# Patient Record
Sex: MALE | Race: WHITE | Employment: FULL TIME | URBAN - METROPOLITAN AREA
[De-identification: names, ages, dates, MRNs, and addresses within clinical notes are randomized per-mention and may not be internally consistent; named-entity substitution may affect disease eponyms.]

---

## 2017-01-04 ENCOUNTER — PRE VISIT (OUTPATIENT)
Dept: PULMONOLOGY | Facility: CLINIC | Age: 37
End: 2017-01-04

## 2017-01-04 NOTE — TELEPHONE ENCOUNTER
1.  Date/reason for appt: 1/25/17 - allergies   2.  Referring provider: Dr. Perez  3.  Call to patient (Yes / No - short description): no, recs / img in epic  4.  Previous care at:   - [facility] ENT   [facility] (scanned in [facility])    - CT Sinus 11/14/16 is in [facility]

## 2017-01-25 ENCOUNTER — OFFICE VISIT (OUTPATIENT)
Dept: PULMONOLOGY | Facility: CLINIC | Age: 37
End: 2017-01-25
Attending: ALLERGY & IMMUNOLOGY
Payer: COMMERCIAL

## 2017-01-25 VITALS
HEART RATE: 84 BPM | SYSTOLIC BLOOD PRESSURE: 126 MMHG | DIASTOLIC BLOOD PRESSURE: 78 MMHG | WEIGHT: 187 LBS | RESPIRATION RATE: 18 BRPM | OXYGEN SATURATION: 95 % | BODY MASS INDEX: 26.77 KG/M2 | HEIGHT: 70 IN | TEMPERATURE: 97.9 F

## 2017-01-25 DIAGNOSIS — J30.89 ALLERGIC RHINITIS CAUSED BY MOLD: ICD-10-CM

## 2017-01-25 DIAGNOSIS — J30.89 ALLERGIC RHINITIS DUE TO AMERICAN HOUSE DUST MITE: Primary | ICD-10-CM

## 2017-01-25 DIAGNOSIS — J30.1 SEASONAL ALLERGIC RHINITIS DUE TO POLLEN: ICD-10-CM

## 2017-01-25 PROCEDURE — 99212 OFFICE O/P EST SF 10 MIN: CPT | Mod: ZF

## 2017-01-25 PROCEDURE — 95004 PERQ TESTS W/ALRGNC XTRCS: CPT | Performed by: ALLERGY & IMMUNOLOGY

## 2017-01-25 PROCEDURE — 95018 ALL TSTG PERQ&IQ DRUGS/BIOL: CPT | Mod: ZF

## 2017-01-25 ASSESSMENT — ENCOUNTER SYMPTOMS
POLYDIPSIA: 1
SMELL DISTURBANCE: 0
INCREASED ENERGY: 0
TROUBLE SWALLOWING: 0
FATIGUE: 1
WEIGHT LOSS: 0
SORE THROAT: 0
CHILLS: 0
FEVER: 0
HALLUCINATIONS: 0
ALTERED TEMPERATURE REGULATION: 0
HOARSE VOICE: 1
NIGHT SWEATS: 0
NECK MASS: 0
WEIGHT GAIN: 0
SINUS PAIN: 1
SINUS CONGESTION: 1
TASTE DISTURBANCE: 0
POLYPHAGIA: 0
DECREASED APPETITE: 0

## 2017-01-25 ASSESSMENT — PAIN SCALES - GENERAL: PAINLEVEL: NO PAIN (0)

## 2017-01-25 NOTE — PATIENT INSTRUCTIONS
"Qnasl 2 sprays in each nostril daily.  If insurance will not cover can use nasocort over the counter 2 sprays in each nostril twice a day.    Mold Allergy    Remove live indoor houseplants (molds are in the soil).    Keep windows and doors shut and run the air conditioner at all times; this keeps the molds outside.    Keep windows closed while in the car and air conditioner on with it set to  recirculate  mode.    Stay indoors as much as possible when the mold count is high. Check allergy counts by going to our website: www.pollen.com    If you have a basement, use a dehumidifier.    Use bleach if you see evidence of molds in bathrooms or ritesh.   MOLD WHERE MOLDS ARE MOST COMMONLY FOUND   Alternaria Outdoors On decaying plants and live plant material, also in soil   Helminthosporium Outdoors On grain plants and grasses, nabeel counts in rural areas when threshing grain   Hormodendrum/Cladosporium Indoors & Outdoors On leather, rubber, cloth, foods, and wood products; in soil, living and dead plants; also released after a rain   Fusarium Outdoors On garden plants (peas, beans, tomatoes, corn etc.) and stored fruits and vegetables   Aspergillus niger Indoors & Outdoors On damp hay, cloth, leather, paper, spoiled foods, decaying plant and vegetable material, in bathrooms and in refrigerator drip pans   Epicoccum Indoors & Outdoors In soil and on living or dead plants (such as small black dots), and uncooked fruit     *All information has been reviewed, updated and approved by:  Dr. [facility] at [facility] updated: 11/2016         Pollen Control Measures      * Keep windows and doors shut and run air conditioner at all times. This keeps outdoor air outside.    * Keep windows closed while in the car and air conditioner on the \"re-circulate\" mode.    * Wash your hair before going to bed at night to reduce exposure during sleep.    * Keep pets outdoors to prevent the pollen from " being brought in on their hair.    * Avoid hanging clothes and linens outside as pollens may collect on the items.     * Don't mow the lawn if you are allergic to grass or weeds. If you must mow the grass, wear a face mask.       Spring: Tree Pollen    Summer: Grass Pollen and Mold    Fall: Weed Pollen and Mold      *All information has been reviewed, updated and approved by:  Dr. Norm Cristina[facility] [facility] updated: 11/2016  DUST MITE ALLERGY  Dust mites are microscopic bugs that live in dust, feeding on dead skin from our bodies. Dust mites flourish in warm, humid environments and therefore are highest  in number in carpeting, pillows and mattresses.     Tips:    Encase pillows, mattresses, and box springs in zippered allergy proof covers.    Wash all bed linens in at least 1300 F every week.    Remove stuffed animals or freeze them every other week.     Remove upholstered furniture from the bedroom and consider removing the carpet.     Keep ceiling fans off in the bedroom as they can stir up dust mite allergens.    Frequently dust and vacuum the house, especially the bedroom.    Acaracides (chemicals which kill mites) are available for use in the home. These acaracides require repeated applications to remain effective and may irritate asthmatics. It is still unclear how much, if any clinical benefit is gained by the use of acaracides. Therefore these agents are usually not recommended for initial mite control.        *All information has been reviewed, updated and approved by:  Dr. Norm Cristina[facility] [facility] updated: 11/2016

## 2017-01-25 NOTE — NURSING NOTE
Chief Complaint   Patient presents with     Consult     New consult on Carlo and his allergy issues     Godwin Hernandez CMA at 12:44 PM on 1/25/2017

## 2017-01-25 NOTE — PROGRESS NOTES
"Reason for Visit  zA Sykes is a 36 year old male who is referred by [facility] and ENT for facial pressure.    Allergy HPI  Az presents today with a concern of \"something funny with his sinuses\".  It has been going on for a little over a year.  It started rather suddenly.  He feels like there is pressure behind his eyes and his face which he went to ENT and they said everything was normal and said maybe see Neurology but he said that he does not feel it is related to neurological pain.  He says he gets drainage in the front of his nose.  It is yellow.  It occasionally has some blood in it.  He denies any sneezing, itchy eyes or itchy nose.  He has tried Flonase and has occasional benefit, but not great.  He has tried Allegra without any benefit.  In general, breathing out of his lungs is fine.  He is otherwise feeling healthy at this time.      SOCIAL HISTORY:  No pets, no smoke exposure.  He is a  and he is uncertain if he has any exposures.  He normally works out of a [employer] but no current problems or high chemical volatile compounds he is working with at this point.      FAMILY HISTORY:  Nobody has allergies.         The patient was seen and examined by Norm Arias MD   Current Outpatient Prescriptions   Medication     fluticasone (FLONASE) 50 MCG/ACT spray     No current facility-administered medications for this visit.     No Known Allergies  Social History     Social History     Marital Status: Single     Spouse Name: N/A     Number of Children: N/A     Years of Education: N/A     Occupational History     Not on file.     Social History Main Topics     Smoking status: Never Smoker      Smokeless tobacco: Not on file     Alcohol Use: No     Drug Use: No     Sexual Activity: No     Other Topics Concern     Not on file     Social History Narrative     Past Medical History   Diagnosis Date     Sebaceous cyst of left axilla      Past Surgical History   Procedure " "Laterality Date     [address] st guidewire       Excise mass axilla Left 1/26/2015     Procedure: EXCISE MASS AXILLA;  Surgeon: Adán Gongora MD;  Location:  OR     Family History   Problem Relation Age of Onset     CANCER       lung-uncle     Breast Cancer Paternal Grandmother      Substance Abuse Mother      CANCER Paternal Grandmother      DIABETES Father      DIABETES Paternal Grandfather      HEART DISEASE Paternal Grandfather      Hypertension Father      Hypertension Paternal Grandfather      CEREBROVASCULAR DISEASE Father      CEREBROVASCULAR DISEASE Paternal Grandfather      Migraines Mother          ROS   A complete ROS was otherwise negative except as noted in the HPI and the end of the note.  /78 mmHg  Pulse 84  Temp(Src) 97.9  F (36.6  C) (Oral)  Resp 18  Ht 1.765 m (5' 9.5\")  Wt 84.823 kg (187 lb)  BMI 27.23 kg/m2  SpO2 95%  Exam:   GENERAL APPEARANCE: Well developed, well nourished, alert, and in no apparent distress.  EYES: PERRL, EOMI, conjunctiva clear non-injected  HENT: Nasal mucosa with no edema and no discharge, except scant blight red blood bilaterally. No nasal polyps.  No facial tenderness.  EARS: Canals clear, TMs normal  MOUTH: Oral mucosa is moist, without any lesions, no tonsillar enlargement, no oropharyngeal exudate.  NECK: Supple, no masses, no thyromegaly.  LYMPHATICS: No significant cervical, or supraclavicular nodes.  RESP: Good air flow throughout.  No crackles. No rhonchi. No wheezes.  CV: Normal S1, S2, regular rhythm, normal rate. No murmur.  No rub. No gallop. No LE edema.   MS: Extremities normal. No clubbing. No cyanosis.  SKIN: No rashes noted  NEURO: Speech normal, normal strength and tone, normal gait and stance  PSYCH: Normal mentation, orientation to person, place, and time.  Results: 39 percutaneous environmental allergen extracts placed by MA and read by MD.  Positive to dust mites, outdoor mold and tree pollens.      Assessment and plan: Az" presents today for initial consultation regarding concern of pressure in his face as well as nasal discharge.  IgE mediated skin testing today was positive to dust mites, mold, typically outdoor molds, can be indoor in potted plants as well as tree pollens which pollinate in the spring.  I do recommend him using Qnasl 2 sprays each nostril daily.  In the spring, we may implement some antihistamines as well.  We also discussed control measures and I did review his sinus CT.  He has already seen ENT and Neurology as recommended.  I recommend we first work on these controls and we will see if this is helping before he does follow up with Neurology.                 Answers for HPI/ROS submitted by the patient on 1/25/2017   General Symptoms: Yes  Skin Symptoms: No  HENT Symptoms: Yes  EYE SYMPTOMS: No  HEART SYMPTOMS: No  LUNG SYMPTOMS: No  INTESTINAL SYMPTOMS: No  URINARY SYMPTOMS: No  REPRODUCTIVE SYMPTOMS: No  SKELETAL SYMPTOMS: No  BLOOD SYMPTOMS: No  NERVOUS SYSTEM SYMPTOMS: No  MENTAL HEALTH SYMPTOMS: No  Fever: No  Loss of appetite: No  Weight loss: No  Weight gain: No  Fatigue: Yes  Night sweats: No  Chills: No  Increased stress: No  Excessive hunger: No  Excessive thirst: Yes  Feeling hot or cold when others believe the temperature is normal: No  Loss of height: No  Post-operative complications: No  Surgical site pain: No  Hallucinations: No  Change in or Loss of Energy: No  Hyperactivity: No  Confusion: No  Ear pain: No  Ear discharge: Yes  Hearing loss: No  Tinnitus: No  Nosebleeds: Yes  Congestion: Yes  Sinus pain: Yes  Trouble swallowing: No   Voice hoarseness: Yes  Mouth sores: Yes  Sore throat: No  Tooth pain: Yes  Gum tenderness: No  Bleeding gums: Yes  Change in taste: No  Change in sense of smell: No  Dry mouth: Yes  Hearing aid used: No  Neck lump: No

## 2017-01-25 NOTE — MR AVS SNAPSHOT
After Visit Summary   1/25/2017    Az Sykes    MRN: [id number]           Patient Information     Date Of Birth          1980        Visit Information        Provider Department      1/25/2017 12:55 PM Norm Cristina MD [facility]        Today's Diagnoses     Allergic rhinitis due to American house dust mite    -  1       Care Instructions    Qnasl 2 sprays in each nostril daily.  If insurance will not cover can use nasocort over the counter 2 sprays in each nostril twice a day.    Mold Allergy    Remove live indoor houseplants (molds are in the soil).    Keep windows and doors shut and run the air conditioner at all times; this keeps the molds outside.    Keep windows closed while in the car and air conditioner on with it set to  recirculate  mode.    Stay indoors as much as possible when the mold count is high. Check allergy counts by going to our website: www.pollen.com    If you have a basement, use a dehumidifier.    Use bleach if you see evidence of molds in bathrooms or ritesh.   MOLD WHERE MOLDS ARE MOST COMMONLY FOUND   Alternaria Outdoors On decaying plants and live plant material, also in soil   Helminthosporium Outdoors On grain plants and grasses, nabeel counts in rural areas when threshing grain   Hormodendrum/Cladosporium Indoors & Outdoors On leather, rubber, cloth, foods, and wood products; in soil, living and dead plants; also released after a rain   Fusarium Outdoors On garden plants (peas, beans, tomatoes, corn etc.) and stored fruits and vegetables   Aspergillus niger Indoors & Outdoors On damp hay, cloth, leather, paper, spoiled foods, decaying plant and vegetable material, in bathrooms and in refrigerator drip pans   Epicoccum Indoors & Outdoors In soil and on living or dead plants (such as small black dots), and uncooked fruit     *All information has been reviewed, updated and approved by:  [facility]  [facility] at [facility] updated: 11/2016         Pollen Control Measures      * Keep windows and doors shut and run air conditioner at all times. This keeps outdoor air outside.    * Keep windows closed while in the car and air conditioner on the \"re-circulate\" mode.    * Wash your hair before going to bed at night to reduce exposure during sleep.    * Keep pets outdoors to prevent the pollen from being brought in on their hair.    * Avoid hanging clothes and linens outside as pollens may collect on the items.     * Don't mow the lawn if you are allergic to grass or weeds. If you must mow the grass, wear a face mask.       Spring: Tree Pollen    Summer: Grass Pollen and Mold    Fall: Weed Pollen and Mold      *All information has been reviewed, updated and approved by:  Dr. Norm Cristina-[place] [facility] updated: 11/2016  DUST MITE ALLERGY  Dust mites are microscopic bugs that live in dust, feeding on dead skin from our bodies. Dust mites flourish in warm, humid environments and therefore are highest  in number in carpeting, pillows and mattresses.     Tips:    Encase pillows, mattresses, and box springs in zippered allergy proof covers.    Wash all bed linens in at least 1300 F every week.    Remove stuffed animals or freeze them every other week.     Remove upholstered furniture from the bedroom and consider removing the carpet.     Keep ceiling fans off in the bedroom as they can stir up dust mite allergens.    Frequently dust and vacuum the house, especially the bedroom.    Acaracides (chemicals which kill mites) are available for use in the home. These acaracides require repeated applications to remain effective and may irritate asthmatics. It is still unclear how much, if any clinical benefit is gained by the use of acaracides. Therefore these agents are usually not recommended for initial mite control.        *All information has been reviewed, updated and approved " "by:  Dr. Norm Cristina-[place] [facility] at [facility] updated: 11/2016        Follow-ups after your visit        Follow-up notes from your care team     Return in about 4 months (around 5/25/2017).      Your next 10 appointments already scheduled     Jun 05, 2017  1:55 PM   (Arrive by 1:40 PM)   New Allergy with Norm Cristina MD   [facility] ([facility])    [address] [place] [id number] [phone]           Do not take anti-histamines or Zantac for seven day prior to your appointment.              Who to contact     If you have questions or need follow up information about today's clinic visit or your schedule please contact [facility] directly at [phone].  Normal or non-critical lab and imaging results will be communicated to you by MyChart, letter or phone within 4 business days after the clinic has received the results. If you do not hear from us within 7 days, please contact the clinic through MyChart or phone. If you have a critical or abnormal lab result, we will notify you by phone as soon as possible.  Submit refill requests through [facility] or call your pharmacy and they will forward the refill request to us. Please allow 3 business days for your refill to be completed.          Additional Information About Your Visit        MyChart Information     [facility] lets you send messages to your doctor, view your test results, renew your prescriptions, schedule appointments and more. To sign up, go to [email or web address] . Click on \"Log in\" on the left side of the screen, which will take you to the Welcome page. Then click on \"Sign up Now\" on the right side of the page.     You will be asked to enter the access code listed below, as well as some personal information. Please follow the directions to create your username and password.     Your access code is: " "[email or web address]  Expires: 2017  6:30 AM     Your access code will  in 90 days. If you need help or a new code, please call your [facility] or [phone].        Care EveryWhere ID     This is your Care EveryWhere ID. This could be used by other organizations to access your [place] medical records  [id number]        Your Vitals Were     Pulse Temperature Respirations Height BMI (Body Mass Index) Pulse Oximetry    84 97.9  F (36.6  C) (Oral) 18 1.765 m (5' 9.5\") 27.23 kg/m2 95%       Blood Pressure from Last 3 Encounters:   17 126/78   01/26/15 104/73   14 113/74    Weight from Last 3 Encounters:   17 84.823 kg (187 lb)   12/15/16 83.915 kg (185 lb)   01/26/15 86.2 kg (190 lb 0.6 oz)              Today, you had the following     No orders found for display         Today's Medication Changes          These changes are accurate as of: 17  1:58 PM.  If you have any questions, ask your nurse or doctor.               Start taking these medicines.        Dose/Directions    Beclomethasone Dipropionate 80 MCG/ACT Aers Nasal Spray   Commonly known as:  QNASL   Used for:  Allergic rhinitis due to American house dust mite   Started by:  Norm Cristina MD        Dose:  2 spray   Spray 2 sprays into both nostrils daily   Quantity:  8.7 g   Refills:  3            Where to get your medicines      These medications were sent to [place] [id number]     Phone:  [phone]    - Beclomethasone Dipropionate 80 MCG/ACT Aers Nasal [place]             Primary Care Provider Office Phone # Fax #    [facility] [place] [phone]       [address] [place] [id number]        Thank you!     Thank you for choosing [facility]  for your care. Our goal is always to provide you with excellent care. Hearing back from our patients is one way we can continue to " improve our services. Please take a few minutes to complete the written survey that you may receive in the mail after your visit with us. Thank you!             Your Updated Medication List - Protect others around you: Learn how to safely use, store and throw away your medicines at www.disposemymeds.org.          This list is accurate as of: 1/25/17  1:58 PM.  Always use your most recent med list.                   Brand Name Dispense Instructions for use    Beclomethasone Dipropionate 80 MCG/ACT Aers Nasal Spray    QNASL    8.7 g    Spray 2 sprays into both nostrils daily       fluticasone 50 MCG/ACT spray    FLONASE     Spray 1 spray into both nostrils daily

## 2017-01-25 NOTE — Clinical Note
"1/25/2017       RE: Az Sykes  [address] [facility] [address] [address]     Dear Colleague,    Thank you for referring your patient, Az Sykes, to the [facility] at [facility]. Please see a copy of my visit note below.    Reason for Visit  Az Sykes is a 36 year old male who is referred by [facility] and ENT for facial pressure.    Allergy HPI  Az presents today with a concern of \"something funny with his sinuses\".  It has been going on for a little over a year.  It started rather suddenly.  He feels like there is pressure behind his eyes and his face which he went to ENT and they said everything was normal and said maybe see Neurology but he said that he does not feel it is related to neurological pain.  He says he gets drainage in the front of his nose.  It is yellow.  It occasionally has some blood in it.  He denies any sneezing, itchy eyes or itchy nose.  He has tried Flonase and has occasional benefit, but not great.  He has tried Allegra without any benefit.  In general, breathing out of his lungs is fine.  He is otherwise feeling healthy at this time.      SOCIAL HISTORY:  No pets, no smoke exposure.  He is a  and he is uncertain if he has any exposures.  He normally works out of a [employer] but no current problems or high chemical volatile compounds he is working with at this point.      FAMILY HISTORY:  Nobody has allergies.         The patient was seen and examined by Norm Arias MD   Current Outpatient Prescriptions   Medication     fluticasone (FLONASE) 50 MCG/ACT spray     No current facility-administered medications for this visit.     No Known Allergies  Social History     Social History     Marital Status: Single     Spouse Name: N/A     Number of Children: N/A     Years of Education: N/A     Occupational History     Not on file.     Social History Main Topics     " "Smoking status: Never Smoker      Smokeless tobacco: Not on file     Alcohol Use: No     Drug Use: No     Sexual Activity: No     Other Topics Concern     Not on file     Social History Narrative     Past Medical History   Diagnosis Date     Sebaceous cyst of left axilla      Past Surgical History   Procedure Laterality Date     [place] st guidewire       Excise mass axilla Left 1/26/2015     Procedure: EXCISE MASS AXILLA;  Surgeon: Adán Gongora MD;  Location: [facility] OR     Family History   Problem Relation Age of Onset     CANCER       lung-uncle     Breast Cancer Paternal Grandmother      Substance Abuse Mother      CANCER Paternal Grandmother      DIABETES Father      DIABETES Paternal Grandfather      HEART DISEASE Paternal Grandfather      Hypertension Father      Hypertension Paternal Grandfather      CEREBROVASCULAR DISEASE Father      CEREBROVASCULAR DISEASE Paternal Grandfather      Migraines Mother          ROS   A complete ROS was otherwise negative except as noted in the HPI and the end of the note.  /78 mmHg  Pulse 84  Temp(Src) 97.9  F (36.6  C) (Oral)  Resp 18  Ht 1.765 m (5' 9.5\")  Wt 84.823 kg (187 lb)  BMI 27.23 kg/m2  SpO2 95%  Exam:   GENERAL APPEARANCE: Well developed, well nourished, alert, and in no apparent distress.  EYES: PERRL, EOMI, conjunctiva clear non-injected  HENT: Nasal mucosa with no edema and no discharge, except scant blight red blood bilaterally. No nasal polyps.  No facial tenderness.  EARS: Canals clear, TMs normal  MOUTH: Oral mucosa is moist, without any lesions, no tonsillar enlargement, no oropharyngeal exudate.  NECK: Supple, no masses, no thyromegaly.  LYMPHATICS: No significant cervical, or supraclavicular nodes.  RESP: Good air flow throughout.  No crackles. No rhonchi. No wheezes.  CV: Normal S1, S2, regular rhythm, normal rate. No murmur.  No rub. No gallop. No LE edema.   MS: Extremities normal. No clubbing. No cyanosis.  SKIN: No rashes noted  NEURO: " Speech normal, normal strength and tone, normal gait and stance  PSYCH: Normal mentation, orientation to person, place, and time.  Results: 39 percutaneous environmental allergen extracts placed by MA and read by MD.  Positive to dust mites, outdoor mold and tree pollens.      Assessment and plan: Az presents today for initial consultation regarding concern of pressure in his face as well as nasal discharge.  IgE mediated skin testing today was positive to dust mites, mold, typically outdoor molds, can be indoor in potted plants as well as tree pollens which pollinate in the spring.  I do recommend him using Qnasl 2 sprays each nostril daily.  In the spring, we may implement some antihistamines as well.  We also discussed control measures and I did review his sinus CT.  He has already seen ENT and Neurology as recommended.  I recommend we first work on these controls and we will see if this is helping before he does follow up with Neurology.           Again, thank you for allowing me to participate in the care of your patient.      Sincerely,    Norm Arias MD

## 2017-08-14 DIAGNOSIS — J30.89 ALLERGIC RHINITIS DUE TO HOUSE DUST MITE: Primary | ICD-10-CM

## 2017-08-14 RX ORDER — FLUTICASONE PROPIONATE 110 UG/1
1 AEROSOL, METERED RESPIRATORY (INHALATION) 2 TIMES DAILY
Qty: 1 INHALER | Refills: 3 | Status: SHIPPED | OUTPATIENT
Start: 2017-08-14